# Patient Record
Sex: MALE | Race: WHITE | URBAN - METROPOLITAN AREA
[De-identification: names, ages, dates, MRNs, and addresses within clinical notes are randomized per-mention and may not be internally consistent; named-entity substitution may affect disease eponyms.]

---

## 2020-07-02 ENCOUNTER — OFFICE VISIT (OUTPATIENT)
Dept: FAMILY MEDICINE CLINIC | Age: 40
End: 2020-07-02

## 2020-07-02 DIAGNOSIS — G93.31 POSTVIRAL FATIGUE SYNDROME: ICD-10-CM

## 2020-07-02 DIAGNOSIS — B34.2 CORONAVIRUS INFECTION: Primary | ICD-10-CM

## 2020-07-02 NOTE — PROGRESS NOTES
HISTORY OF PRESENT ILLNESS  Lonnie Manriquez is a 35 y.o. male. HPI  I was at home while conducting this encounter. Consent:  He and/or his healthcare decision maker is aware that this patient-initiated Telehealth encounter is a billable service, with coverage as determined by his insurance carrier. He is aware that he may receive a bill and has provided verbal consent to proceed: Yes    This virtual visit was conducted telephone encounter only. -  I affirm this is a Patient Initiated Episode with an Established Patient who has not had a related appointment within my department in the past 7 days or scheduled within the next 24 hours. Note: this encounter is not billable if this call serves to triage the patient into an appointment for the relevant concern. Total Time: minutes: 11-20 minutes. Patient states June 6th had positive COVID 19 test at [facility] in [place]. He stayed home,  He was given the diagnosis June 9th. He stayed quarantined for the days recommended. He had another test and was negative this was June 21th. Patient states since he recover, he feels he remains weak, he has leg pains, especially when working. He feels very tired. This he didn't have before the virus. He wanted to know if he can have some labs done. ROS    Physical Exam    ASSESSMENT and PLAN  Diagnoses and all orders for this visit:    1. Coronavirus infection    2. Postviral fatigue syndrome    Coronavirus infection resolved  Patient advised to hydrate well,  He states he is eating well. We will bring him in for face to face and run labs as requested per patient.

## 2020-09-28 ENCOUNTER — TELEPHONE (OUTPATIENT)
Dept: FAMILY MEDICINE CLINIC | Age: 40
End: 2020-09-28

## 2020-09-28 NOTE — TELEPHONE ENCOUNTER
Herminia Beltre  Has been contacted 9/28/2020 to schedule a F2F with [facility] NO ANSWER LEFT A vm .     Thank you   Pablo Bocanegra